# Patient Record
(demographics unavailable — no encounter records)

---

## 2025-02-03 NOTE — ASSESSMENT
[FreeTextEntry1] : - due to her nursing we are not able to rx any meds. Tylenol for pain will get stat mri to evaluate hip synovitis f/u after mri

## 2025-02-03 NOTE — HISTORY OF PRESENT ILLNESS
[Right Leg] : right leg [Dull/Aching] : dull/aching [Localized] : localized [Radiating] : radiating [Tightness] : tightness [de-identified] :  02/03/2025: She is for evaluation of right hip/groin/thigh pain.  She notes she had a baby 2 weeks ago and since that time she has had significant right hip/groin pain she is presently nursing.  She has pain with activities of rotation and she is ambulating with the limp using a cane. [] : no [FreeTextEntry1] : RT hip  [FreeTextEntry5] : RT hip pain that developed after having her baby 2 weeks ago

## 2025-02-03 NOTE — IMAGING
[Straightening consistent with spasm] : Straightening consistent with spasm [de-identified] : normal

## 2025-02-14 NOTE — ASSESSMENT
[FreeTextEntry1] : I reviewed the MRI scan Diagnosis reviewed with her, should get progressively better on its own over the next couple of months Can use NSAIDs such as Ibuprofen for pain Vitamin D3 supplementation discussed for next 6 weeks- 2000 units /day

## 2025-02-14 NOTE — HISTORY OF PRESENT ILLNESS
[Right Leg] : right leg [Dull/Aching] : dull/aching [Localized] : localized [Radiating] : radiating [Tightness] : tightness [de-identified] : 02/14/2025 Had MRI: c/w transient osteoporosis of the proximal femur 02/03/2025: She is for evaluation of right hip/groin/thigh pain.  She notes she had a baby 2 weeks ago and since that time she has had significant right hip/groin pain she is presently nursing.  She has pain with activities of rotation and she is ambulating with the limp using a cane. [] : no [FreeTextEntry1] : RT hip  [FreeTextEntry5] : RT hip pain that developed after having her baby 2 weeks ago